# Patient Record
Sex: FEMALE | Race: WHITE | NOT HISPANIC OR LATINO | Employment: OTHER | URBAN - METROPOLITAN AREA
[De-identification: names, ages, dates, MRNs, and addresses within clinical notes are randomized per-mention and may not be internally consistent; named-entity substitution may affect disease eponyms.]

---

## 2024-05-30 ENCOUNTER — OFFICE VISIT (OUTPATIENT)
Dept: PODIATRY | Facility: CLINIC | Age: 64
End: 2024-05-30
Payer: OTHER GOVERNMENT

## 2024-05-30 VITALS — HEIGHT: 63 IN | WEIGHT: 242 LBS | BODY MASS INDEX: 42.88 KG/M2

## 2024-05-30 DIAGNOSIS — M76.61 ACHILLES TENDINITIS OF RIGHT LOWER EXTREMITY: ICD-10-CM

## 2024-05-30 DIAGNOSIS — M77.31 CALCANEAL SPUR OF RIGHT FOOT: Primary | ICD-10-CM

## 2024-05-30 PROCEDURE — 99999 PR PBB SHADOW E&M-NEW PATIENT-LVL IV: CPT | Mod: PBBFAC,,, | Performed by: PODIATRIST

## 2024-05-30 PROCEDURE — 99203 OFFICE O/P NEW LOW 30 MIN: CPT | Mod: S$PBB,,, | Performed by: PODIATRIST

## 2024-05-30 PROCEDURE — 99204 OFFICE O/P NEW MOD 45 MIN: CPT | Mod: PBBFAC,PN | Performed by: PODIATRIST

## 2024-05-30 RX ORDER — MINOXIDIL 2.5 MG/1
1.25 TABLET ORAL
COMMUNITY
Start: 2024-04-01

## 2024-05-30 RX ORDER — CEPHALEXIN 500 MG/1
500 CAPSULE ORAL EVERY 12 HOURS
COMMUNITY
Start: 2024-03-11 | End: 2024-05-30

## 2024-05-30 RX ORDER — DICLOFENAC SODIUM 75 MG/1
75 TABLET, DELAYED RELEASE ORAL 2 TIMES DAILY
Qty: 60 TABLET | Refills: 1 | Status: SHIPPED | OUTPATIENT
Start: 2024-05-30 | End: 2024-07-29

## 2024-05-30 RX ORDER — AZELASTINE 1 MG/ML
SPRAY, METERED NASAL
COMMUNITY

## 2024-05-30 RX ORDER — POLYETHYLENE GLYCOL 3350, SODIUM SULFATE, SODIUM CHLORIDE, POTASSIUM CHLORIDE, ASCORBIC ACID, SODIUM ASCORBATE 140-9-5.2G
KIT ORAL
COMMUNITY
Start: 2024-02-08 | End: 2024-05-30

## 2024-05-30 RX ORDER — MINERAL OIL
180 ENEMA (ML) RECTAL
COMMUNITY

## 2024-05-30 RX ORDER — NAPROXEN 500 MG/1
500 TABLET ORAL
COMMUNITY
End: 2024-05-30

## 2024-05-30 RX ORDER — DICYCLOMINE HYDROCHLORIDE 20 MG/1
20 TABLET ORAL 4 TIMES DAILY
COMMUNITY
Start: 2024-03-11 | End: 2024-05-30

## 2024-05-30 RX ORDER — TRIAMCINOLONE ACETONIDE 55 UG/1
SPRAY, METERED NASAL
COMMUNITY
Start: 2024-05-21

## 2024-06-03 NOTE — PROGRESS NOTES
"Subjective:       Patient ID: Narda Garza is a 63 y.o. female.    Chief Complaint: Heel Pain (Right heel )    Patient presents today she is complaining of right heel pain for about 1-2 months she states that around the beginning of April she had hit the back of her right heel when trying to push down the recliner she experienced a shocking type pain and she has been having problems ever since that time with the back of her right heel.  Patient states she has not having pain when she stands or walks it is only when something touches or bumps the back of her right heel.    History reviewed. No pertinent past medical history.  History reviewed. No pertinent surgical history.  No family history on file.  Social History     Socioeconomic History    Marital status:    Tobacco Use    Smoking status: Never     Passive exposure: Never    Smokeless tobacco: Never       Current Outpatient Medications   Medication Sig Dispense Refill    azelastine (ASTELIN) 137 mcg (0.1 %) nasal spray SMARTSIG:Both Nares      fexofenadine (ALLEGRA) 180 MG tablet Take 180 mg by mouth.      minoxidiL (LONITEN) 2.5 MG tablet Take 1.25 mg by mouth.      triamcinolone (NASACORT) 55 mcg nasal inhaler by Nasal route.      diclofenac (VOLTAREN) 75 MG EC tablet Take 1 tablet (75 mg total) by mouth 2 (two) times daily. 60 tablet 1     No current facility-administered medications for this visit.     Review of patient's allergies indicates:   Allergen Reactions    Flonase [fluticasone] Anaphylaxis       Review of Systems   Musculoskeletal:  Positive for arthralgias and gait problem.   All other systems reviewed and are negative.      Objective:      Vitals:    05/30/24 1551   BP: (!) (P) 147/90   BP Location: (P) Left arm   Patient Position: (P) Sitting   Pulse: (P) 105   Weight: 109.8 kg (242 lb)   Height: 5' 3" (1.6 m)     Physical Exam  Vitals and nursing note reviewed.   Constitutional:       Appearance: Normal appearance.   Cardiovascular: "      Pulses:           Dorsalis pedis pulses are 1+ on the right side.        Posterior tibial pulses are 1+ on the right side and 1+ on the left side.   Pulmonary:      Effort: Pulmonary effort is normal.   Musculoskeletal:         General: Swelling, tenderness and deformity present.      Right foot: Decreased range of motion. Deformity present.        Feet:    Feet:      Right foot:      Protective Sensation: 2 sites tested.  2 sites sensed.      Skin integrity: Erythema and warmth present.      Left foot:      Protective Sensation: 2 sites tested.  2 sites sensed.   Skin:     Capillary Refill: Capillary refill takes 2 to 3 seconds.      Findings: Erythema present.   Neurological:      General: No focal deficit present.      Mental Status: She is alert.   Psychiatric:         Mood and Affect: Mood normal.         Behavior: Behavior normal.                      Assessment:       1. Calcaneal spur of right foot    2. Achilles tendinitis of right lower extremity        Plan:       Patient presents today she is complaining of right heel pain for about 1-2 months she states that around the beginning of April she had hit the back of her right heel when trying to push down the recliner she experienced a shocking type pain and she has been having problems ever since that time with the back of her right heel.  Patient states she has not having pain when she stands or walks it is only when something touches or bumps the back of her right heel.  On evaluation patient has a wear very well-defined area of tenderness directly at the Achilles tendon insertion right she has no skin breaks no signs of infection however there is associated inflammation with positive erythema edema and increased skin temperature even the light touch cause the patient discomfort at the Achilles tendon insertion there is palpable spurring also noted surrounding the area.  I did advised the patient clearly she injured the likely spurring at the  posterior aspect of the patient's right heel where the Achilles tendon inserts did advised the patient it has not uncommon for patients to have spurs in this area but do not necessarily have pain or discomfort often times as if the spur starts to fracture it will become extremely painful especially when it is bumped or touched.  Patient is concerned because she has going on a trip to [place] in August and she does not want to have discomfort because she is going to have to wear closed in shoes I did order plain film x-rays of the patient's right foot I have started her on diclofenac she is going to start applying Voltaren gel 3 times a day every day she already has some at home and I have dispensed the patient some heel lifts to take pressure off the heel I put these on her sandals today and gave her extra lifts so she can use these at all times I do plan to see her for follow-up in several weeks.  Patient will have the x-rays performed prior to her follow-up evaluation.This note was created using [employer] voice recognition software that occasionally misinterpreted phrases or words.

## 2024-06-13 ENCOUNTER — OFFICE VISIT (OUTPATIENT)
Dept: PODIATRY | Facility: CLINIC | Age: 64
End: 2024-06-13
Payer: OTHER GOVERNMENT

## 2024-06-13 ENCOUNTER — HOSPITAL ENCOUNTER (OUTPATIENT)
Dept: RADIOLOGY | Facility: HOSPITAL | Age: 64
Discharge: HOME | End: 2024-06-13
Attending: PODIATRIST
Payer: OTHER GOVERNMENT

## 2024-06-13 VITALS
HEART RATE: 102 BPM | DIASTOLIC BLOOD PRESSURE: 74 MMHG | BODY MASS INDEX: 42.89 KG/M2 | WEIGHT: 242.06 LBS | SYSTOLIC BLOOD PRESSURE: 120 MMHG | HEIGHT: 63 IN

## 2024-06-13 DIAGNOSIS — M77.31 CALCANEAL SPUR OF RIGHT FOOT: ICD-10-CM

## 2024-06-13 DIAGNOSIS — M76.61 ACHILLES TENDINITIS OF RIGHT LOWER EXTREMITY: Primary | ICD-10-CM

## 2024-06-13 PROCEDURE — 99999PBSHW PR PBB SHADOW TECHNICAL ONLY FILED TO HB: Mod: PBBFAC,,,

## 2024-06-13 PROCEDURE — 99214 OFFICE O/P EST MOD 30 MIN: CPT | Mod: S$PBB,,, | Performed by: PODIATRIST

## 2024-06-13 PROCEDURE — 99213 OFFICE O/P EST LOW 20 MIN: CPT | Mod: PBBFAC,25,PN | Performed by: PODIATRIST

## 2024-06-13 PROCEDURE — 96372 THER/PROPH/DIAG INJ SC/IM: CPT | Mod: PBBFAC,59,PN

## 2024-06-13 PROCEDURE — 73630 X-RAY EXAM OF FOOT: CPT | Mod: 26,RT,, | Performed by: RADIOLOGY

## 2024-06-13 PROCEDURE — 99999 PR PBB SHADOW E&M-EST. PATIENT-LVL III: CPT | Mod: PBBFAC,,, | Performed by: PODIATRIST

## 2024-06-13 PROCEDURE — 73630 X-RAY EXAM OF FOOT: CPT | Mod: TC,PN,RT

## 2024-06-13 RX ORDER — TRIAMCINOLONE ACETONIDE 40 MG/ML
80 INJECTION, SUSPENSION INTRA-ARTICULAR; INTRAMUSCULAR
Status: COMPLETED | OUTPATIENT
Start: 2024-06-13 | End: 2024-06-13

## 2024-06-13 RX ORDER — KETOROLAC TROMETHAMINE 30 MG/ML
60 INJECTION, SOLUTION INTRAMUSCULAR; INTRAVENOUS
Status: COMPLETED | OUTPATIENT
Start: 2024-06-13 | End: 2024-06-13

## 2024-06-13 RX ADMIN — TRIAMCINOLONE ACETONIDE 80 MG: 40 INJECTION, SUSPENSION INTRA-ARTICULAR; INTRAMUSCULAR at 02:06

## 2024-06-13 RX ADMIN — KETOROLAC TROMETHAMINE 60 MG: 30 INJECTION, SOLUTION INTRAMUSCULAR at 02:06

## 2024-06-16 NOTE — PROGRESS NOTES
"Subjective:       Patient ID: Narda Garza is a 63 y.o. female.    Chief Complaint: Heel Spurs and Tendonitis    Patient presents today she is complaining of right heel pain for about 1-2 months she states that around the beginning of April she had hit the back of her right heel when trying to push down the recliner she experienced a shocking type pain and she has been having problems ever since that time with the back of her right heel.  Patient states she has not having pain when she stands or walks it is only when something touches or bumps the back of her right heel.    History reviewed. No pertinent past medical history.  History reviewed. No pertinent surgical history.  No family history on file.  Social History     Socioeconomic History    Marital status:    Tobacco Use    Smoking status: Never     Passive exposure: Never    Smokeless tobacco: Never       Current Outpatient Medications   Medication Sig Dispense Refill    azelastine (ASTELIN) 137 mcg (0.1 %) nasal spray SMARTSIG:Both Nares      diclofenac (VOLTAREN) 75 MG EC tablet Take 1 tablet (75 mg total) by mouth 2 (two) times daily. 60 tablet 1    fexofenadine (ALLEGRA) 180 MG tablet Take 180 mg by mouth.      minoxidiL (LONITEN) 2.5 MG tablet Take 1.25 mg by mouth.      triamcinolone (NASACORT) 55 mcg nasal inhaler by Nasal route.       No current facility-administered medications for this visit.     Review of patient's allergies indicates:   Allergen Reactions    Flonase [fluticasone] Anaphylaxis       Review of Systems   Musculoskeletal:  Positive for arthralgias and gait problem.   All other systems reviewed and are negative.      Objective:      Vitals:    06/13/24 1349   BP: 120/74   BP Location: Left arm   Patient Position: Sitting   Pulse: 102   Weight: 109.8 kg (242 lb 1 oz)   Height: 5' 3" (1.6 m)     Physical Exam  Vitals and nursing note reviewed.   Constitutional:       Appearance: Normal appearance.   Cardiovascular:      Pulses:  "          Dorsalis pedis pulses are 1+ on the right side.        Posterior tibial pulses are 1+ on the right side and 1+ on the left side.   Pulmonary:      Effort: Pulmonary effort is normal.   Musculoskeletal:         General: Swelling, tenderness and deformity present.      Right foot: Decreased range of motion. Deformity present.        Feet:    Feet:      Right foot:      Protective Sensation: 2 sites tested.  2 sites sensed.      Skin integrity: Erythema and warmth present.      Left foot:      Protective Sensation: 2 sites tested.  2 sites sensed.   Skin:     Capillary Refill: Capillary refill takes 2 to 3 seconds.      Findings: Erythema present.   Neurological:      General: No focal deficit present.      Mental Status: She is alert.   Psychiatric:         Mood and Affect: Mood normal.         Behavior: Behavior normal.                                Assessment:       1. Achilles tendinitis of right lower extremity    2. Calcaneal spur of right foot        Plan:       Patient presents today for follow-up of posterior calcaneal exostosis and Achilles tendinitis right.  Patient is currently unable to wear a closed back shoe due to discomfort with pressure to the back of her heel.  Patient is taking the diclofenac as directed.  Patient states she is using the heel lifts and really is only having pain to pressure on the back of her heel.  Patient will continue to take the diclofenac I did review the x-rays at length and in detail showing the patient the spur at the Achilles tendon insertion right I did advised the patient even if there is not an obvious fracture of the spur there may be some micro fracturing of the spur which would explain the discomfort that she is having.  I did recommend an IM steroid injection today of Kenalog IM injection of Toradol left patient has tolerated steroid injections previously and does not have an allergy to these she tolerated the injections well.  Patient will continue to use  the heel lifts at all times making sure that nothing rubs or touches the back of her heel I do plan to follow up with her in 2 weeks we may have to consider an MRI if the patient is not showing signs of improvement.  This note was created using [employer] voice recognition software that occasionally misinterpreted phrases or words.

## 2024-06-27 ENCOUNTER — OFFICE VISIT (OUTPATIENT)
Dept: PODIATRY | Facility: CLINIC | Age: 64
End: 2024-06-27
Payer: OTHER GOVERNMENT

## 2024-06-27 VITALS
BODY MASS INDEX: 42.89 KG/M2 | HEART RATE: 74 BPM | SYSTOLIC BLOOD PRESSURE: 140 MMHG | WEIGHT: 242.06 LBS | DIASTOLIC BLOOD PRESSURE: 77 MMHG | HEIGHT: 63 IN

## 2024-06-27 DIAGNOSIS — M76.61 ACHILLES TENDINITIS OF RIGHT LOWER EXTREMITY: Primary | ICD-10-CM

## 2024-06-27 DIAGNOSIS — M77.31 CALCANEAL SPUR OF RIGHT FOOT: ICD-10-CM

## 2024-06-27 PROCEDURE — 99999 PR PBB SHADOW E&M-EST. PATIENT-LVL III: CPT | Mod: PBBFAC,,, | Performed by: PODIATRIST

## 2024-06-27 PROCEDURE — 99213 OFFICE O/P EST LOW 20 MIN: CPT | Mod: S$PBB,,, | Performed by: PODIATRIST

## 2024-06-27 PROCEDURE — 99213 OFFICE O/P EST LOW 20 MIN: CPT | Mod: PBBFAC,PN | Performed by: PODIATRIST

## 2024-06-30 NOTE — PROGRESS NOTES
"Subjective:       Patient ID: Narda Garza is a 63 y.o. female.    Chief Complaint: Follow-up   Patient presents today for follow-up of Achilles tendinitis with posterior calcaneal exostosis right.    History reviewed. No pertinent past medical history.  History reviewed. No pertinent surgical history.  No family history on file.  Social History     Socioeconomic History    Marital status:    Tobacco Use    Smoking status: Never     Passive exposure: Never    Smokeless tobacco: Never       Current Outpatient Medications   Medication Sig Dispense Refill    azelastine (ASTELIN) 137 mcg (0.1 %) nasal spray SMARTSIG:Both Nares      diclofenac (VOLTAREN) 75 MG EC tablet Take 1 tablet (75 mg total) by mouth 2 (two) times daily. 60 tablet 1    fexofenadine (ALLEGRA) 180 MG tablet Take 180 mg by mouth.      minoxidiL (LONITEN) 2.5 MG tablet Take 1.25 mg by mouth.      triamcinolone (NASACORT) 55 mcg nasal inhaler by Nasal route.       No current facility-administered medications for this visit.     Review of patient's allergies indicates:   Allergen Reactions    Flonase [fluticasone] Anaphylaxis       Review of Systems   Musculoskeletal:  Positive for arthralgias and gait problem.   All other systems reviewed and are negative.      Objective:      Vitals:    06/27/24 0859   BP: (!) 140/77   BP Location: Right arm   Patient Position: Sitting   Pulse: 74   Weight: 109.8 kg (242 lb 1 oz)   Height: 5' 3" (1.6 m)     Physical Exam  Vitals and nursing note reviewed.   Constitutional:       Appearance: Normal appearance.   Cardiovascular:      Pulses:           Dorsalis pedis pulses are 1+ on the right side.        Posterior tibial pulses are 1+ on the right side and 1+ on the left side.   Pulmonary:      Effort: Pulmonary effort is normal.   Musculoskeletal:         General: Swelling, tenderness and deformity present.      Right foot: Decreased range of motion. Deformity present.        Feet:    Feet:      Right foot: "      Protective Sensation: 2 sites tested.  2 sites sensed.      Skin integrity: Erythema and warmth present.      Left foot:      Protective Sensation: 2 sites tested.  2 sites sensed.   Skin:     Capillary Refill: Capillary refill takes 2 to 3 seconds.      Findings: Erythema present.   Neurological:      General: No focal deficit present.      Mental Status: She is alert.   Psychiatric:         Mood and Affect: Mood normal.         Behavior: Behavior normal.                                          Assessment:       1. Achilles tendinitis of right lower extremity    2. Calcaneal spur of right foot        Plan:       Patient presents today for follow-up of posterior calcaneal exostosis and Achilles tendinitis right.  Patient states she is doing a lot better she relates that she has not really having any significant pain or discomfort and the IM injections worked very well she is taking the diclofenac I gave her a refill on the diclofenac.  Patient is going to start her walking regiment she is going to try walking in a closed in shoe because she is doing that much better she is going to [place] in August so she is wants to start to do more increase activity she has a lot less swelling and inflammation at the posterior aspect of the right heel in comparison to previous evaluation.  I did advised the patient she needs to continue to use the heel lifts at all times certainly if she is getting ready to go on her trip and she starts having any inflammation we can give her another IM injection to help to settle down any inflammation but at this point she has improved considerably she needs to take it easy not to overdo it and I will follow up with her as needed.  This note was created using [employer] voice recognition software that occasionally misinterpreted phrases or words.